# Patient Record
Sex: MALE | Race: WHITE | NOT HISPANIC OR LATINO | URBAN - METROPOLITAN AREA
[De-identification: names, ages, dates, MRNs, and addresses within clinical notes are randomized per-mention and may not be internally consistent; named-entity substitution may affect disease eponyms.]

---

## 2020-10-06 ENCOUNTER — OFFICE VISIT (OUTPATIENT)
Dept: URBAN - METROPOLITAN AREA CLINIC 81 | Facility: CLINIC | Age: 70
End: 2020-10-06
Payer: COMMERCIAL

## 2020-10-06 DIAGNOSIS — R42 VERTIGO: ICD-10-CM

## 2020-10-06 DIAGNOSIS — H52.03 HYPERMETROPIA, BILATERAL: Primary | ICD-10-CM

## 2020-10-06 PROCEDURE — 92004 COMPRE OPH EXAM NEW PT 1/>: CPT | Performed by: OPTOMETRIST

## 2020-10-06 ASSESSMENT — INTRAOCULAR PRESSURE
OS: 16
OD: 17

## 2020-10-06 ASSESSMENT — VISUAL ACUITY
OS: 20/20
OD: 20/20

## 2020-10-06 ASSESSMENT — KERATOMETRY
OS: 43.13
OD: 42.88

## 2020-10-06 NOTE — IMPRESSION/PLAN
Impression: Vertigo: R42.

 - Dizziness/vertigo-like symptoms intermittently reported by pt as episodes - Discussed possible etiologies such as postural hypotension, true vertigo, blood flow issues, etc. 
 - Pt states getting carotid doppler and CT of head - recommend CTA carotid from aortic arch to cavernous sinus if carotid arteries are of high suspicion. No signs of carotid involvement in eyes such as OIS or plaques.  Plan: Monitor PRN

## 2020-10-06 NOTE — IMPRESSION/PLAN
Impression: Hypermetropia, bilateral: H52.03. Plan: New spec Rx given - Discussed changes and possible adaptation period. 

RTC 1 year/PRN

## 2021-10-13 ENCOUNTER — OFFICE VISIT (OUTPATIENT)
Dept: URBAN - METROPOLITAN AREA CLINIC 81 | Facility: CLINIC | Age: 71
End: 2021-10-13
Payer: COMMERCIAL

## 2021-10-13 DIAGNOSIS — H43.813 VITREOUS DEGENERATION, BILATERAL: ICD-10-CM

## 2021-10-13 DIAGNOSIS — H35.413 LATTICE DEGENERATION OF RETINA, BILATERAL: Primary | ICD-10-CM

## 2021-10-13 DIAGNOSIS — Z96.1 PRESENCE OF INTRAOCULAR LENS: ICD-10-CM

## 2021-10-13 PROCEDURE — 92014 COMPRE OPH EXAM EST PT 1/>: CPT | Performed by: OPTOMETRIST

## 2021-10-13 ASSESSMENT — INTRAOCULAR PRESSURE
OS: 15
OD: 14

## 2021-10-13 ASSESSMENT — KERATOMETRY
OS: 43.13
OD: 42.88

## 2021-10-13 NOTE — IMPRESSION/PLAN
Impression: Presence of intraocular lens: Z96.1. s/p YAG OU Plan: Stable, monitor for changes. Continue with OTCR for near task.